# Patient Record
Sex: FEMALE | Race: OTHER | Employment: FULL TIME | URBAN - METROPOLITAN AREA
[De-identification: names, ages, dates, MRNs, and addresses within clinical notes are randomized per-mention and may not be internally consistent; named-entity substitution may affect disease eponyms.]

---

## 2017-06-20 ENCOUNTER — LAB ENCOUNTER (OUTPATIENT)
Dept: LAB | Facility: REFERENCE LAB | Age: 22
End: 2017-06-20
Attending: OBSTETRICS & GYNECOLOGY
Payer: COMMERCIAL

## 2017-06-20 ENCOUNTER — OFFICE VISIT (OUTPATIENT)
Dept: OBGYN CLINIC | Facility: CLINIC | Age: 22
End: 2017-06-20

## 2017-06-20 VITALS
BODY MASS INDEX: 22.87 KG/M2 | WEIGHT: 142.31 LBS | DIASTOLIC BLOOD PRESSURE: 80 MMHG | SYSTOLIC BLOOD PRESSURE: 120 MMHG | HEIGHT: 66 IN

## 2017-06-20 DIAGNOSIS — Z11.3 SCREEN FOR STD (SEXUALLY TRANSMITTED DISEASE): ICD-10-CM

## 2017-06-20 DIAGNOSIS — N89.8 VAGINAL LESION: Primary | ICD-10-CM

## 2017-06-20 DIAGNOSIS — N89.8 VAGINAL LESION: ICD-10-CM

## 2017-06-20 DIAGNOSIS — N89.8 VAGINAL DISCHARGE: ICD-10-CM

## 2017-06-20 PROCEDURE — 36415 COLL VENOUS BLD VENIPUNCTURE: CPT

## 2017-06-20 PROCEDURE — 87205 SMEAR GRAM STAIN: CPT | Performed by: OBSTETRICS & GYNECOLOGY

## 2017-06-20 PROCEDURE — 87808 TRICHOMONAS ASSAY W/OPTIC: CPT | Performed by: OBSTETRICS & GYNECOLOGY

## 2017-06-20 PROCEDURE — 86696 HERPES SIMPLEX TYPE 2 TEST: CPT

## 2017-06-20 PROCEDURE — 86695 HERPES SIMPLEX TYPE 1 TEST: CPT

## 2017-06-20 PROCEDURE — 86694 HERPES SIMPLEX NES ANTBDY: CPT

## 2017-06-20 PROCEDURE — 87106 FUNGI IDENTIFICATION YEAST: CPT | Performed by: OBSTETRICS & GYNECOLOGY

## 2017-06-20 PROCEDURE — 99203 OFFICE O/P NEW LOW 30 MIN: CPT | Performed by: OBSTETRICS & GYNECOLOGY

## 2017-06-20 PROCEDURE — 87798 DETECT AGENT NOS DNA AMP: CPT | Performed by: OBSTETRICS & GYNECOLOGY

## 2017-06-20 PROCEDURE — G0452 MOLECULAR PATHOLOGY INTERPR: HCPCS | Performed by: OBSTETRICS & GYNECOLOGY

## 2017-06-20 RX ORDER — PHENTERMINE HYDROCHLORIDE 37.5 MG/1
TABLET ORAL
COMMUNITY
Start: 2017-05-23

## 2017-06-20 NOTE — PROGRESS NOTES
GYN H&P     2017  9:37 AM    CC: Patient is here to establish care    HPI: Patient is a 22year old  here with concerns about recurrent vaginal discharge, irritation, and vulvar sore that feels like a cut.  Has been using multiple antibiotic pre tenderness with erosive area, HSV cultures done; mucoid discharge cultures obtained   Uterus: normal size, mobile, nontender  Cervix: normal os, no lesions or bleeding  Adnexa:normal size, bilaterally nontender, no palpable masses  Cul-de-sac: normal  R/V:

## 2017-06-22 ENCOUNTER — TELEPHONE (OUTPATIENT)
Dept: OBGYN CLINIC | Facility: CLINIC | Age: 22
End: 2017-06-22

## 2017-06-22 RX ORDER — VALACYCLOVIR HYDROCHLORIDE 1 G/1
1 TABLET, FILM COATED ORAL DAILY
Qty: 10 TABLET | Refills: 0 | Status: SHIPPED | OUTPATIENT
Start: 2017-06-22 | End: 2017-07-02

## 2017-06-22 NOTE — PROGRESS NOTES
Quick Note:    Results given to pt by Dr Terry Ramos, Rx sent over for Valtrex 1g p.o daily x 10days  ______

## 2017-06-22 NOTE — TELEPHONE ENCOUNTER
Pt notified that test results are in with one pending. Results need to be discussed with Dr. Nancy Fontaine when she returns on Fri. Pt states please have Dr. Nancy Fontaine call as soon as possible.

## 2017-07-13 ENCOUNTER — OFFICE VISIT (OUTPATIENT)
Dept: OBGYN CLINIC | Facility: CLINIC | Age: 22
End: 2017-07-13

## 2017-07-13 VITALS
SYSTOLIC BLOOD PRESSURE: 120 MMHG | WEIGHT: 143.19 LBS | BODY MASS INDEX: 23.01 KG/M2 | HEIGHT: 66 IN | DIASTOLIC BLOOD PRESSURE: 74 MMHG

## 2017-07-13 DIAGNOSIS — R39.9 UTI SYMPTOMS: Primary | ICD-10-CM

## 2017-07-13 DIAGNOSIS — N89.8 VAGINAL ODOR: ICD-10-CM

## 2017-07-13 LAB
APPEARANCE: CLEAR
BILIRUB UR QL: NEGATIVE
CLARITY UR: CLEAR
COLOR UR: YELLOW
GLUCOSE UR-MCNC: NEGATIVE MG/DL
HGB UR QL STRIP.AUTO: NEGATIVE
KETONES UR-MCNC: NEGATIVE MG/DL
LEUKOCYTE ESTERASE UR QL STRIP.AUTO: NEGATIVE
MULTISTIX LOT#: NORMAL NUMERIC
NITRITE UR QL STRIP.AUTO: NEGATIVE
PH UR: 7 [PH] (ref 5–8)
PH, URINE: 7 (ref 4.5–8)
PROT UR-MCNC: NEGATIVE MG/DL
SP GR UR STRIP: 1.01 (ref 1–1.03)
SPECIFIC GRAVITY: 1.02 (ref 1–1.03)
UROBILINOGEN UR STRIP-ACNC: <2
UROBILINOGEN,SEMI-QN: 0.2 MG/DL (ref 0–1.9)
VIT C UR-MCNC: NEGATIVE MG/DL

## 2017-07-13 PROCEDURE — 81002 URINALYSIS NONAUTO W/O SCOPE: CPT | Performed by: OBSTETRICS & GYNECOLOGY

## 2017-07-13 PROCEDURE — 87086 URINE CULTURE/COLONY COUNT: CPT | Performed by: OBSTETRICS & GYNECOLOGY

## 2017-07-13 PROCEDURE — 87106 FUNGI IDENTIFICATION YEAST: CPT | Performed by: OBSTETRICS & GYNECOLOGY

## 2017-07-13 PROCEDURE — 99213 OFFICE O/P EST LOW 20 MIN: CPT | Performed by: OBSTETRICS & GYNECOLOGY

## 2017-07-13 PROCEDURE — 87808 TRICHOMONAS ASSAY W/OPTIC: CPT | Performed by: OBSTETRICS & GYNECOLOGY

## 2017-07-13 PROCEDURE — 87205 SMEAR GRAM STAIN: CPT | Performed by: OBSTETRICS & GYNECOLOGY

## 2017-07-13 RX ORDER — CEFADROXIL 500 MG/1
CAPSULE ORAL
COMMUNITY
Start: 2017-05-23

## 2017-07-13 RX ORDER — MELOXICAM 15 MG/1
TABLET ORAL
COMMUNITY
Start: 2017-07-12

## 2017-07-13 NOTE — PROGRESS NOTES
Shira Dickerson is a 25year old female. HPI:   Patient presents with:  Vaginal Problem: vaginal odor and some lower back pain, pt thinks might be UTI       Here with concerns about increasingly notable vaginal odor and urinary irritative symptoms.  Gokul Chambers

## 2017-07-15 LAB
GENITAL VAGINOSIS SCREEN: POSITIVE
TRICHOMONAS SCREEN: NEGATIVE

## 2017-07-17 ENCOUNTER — TELEPHONE (OUTPATIENT)
Dept: OBGYN CLINIC | Facility: CLINIC | Age: 22
End: 2017-07-17

## 2017-07-17 RX ORDER — CLINDAMYCIN HYDROCHLORIDE 300 MG/1
300 CAPSULE ORAL 2 TIMES DAILY
Qty: 14 CAPSULE | Refills: 0 | Status: SHIPPED | OUTPATIENT
Start: 2017-07-17 | End: 2017-07-17

## 2017-07-17 RX ORDER — CLINDAMYCIN HYDROCHLORIDE 300 MG/1
300 CAPSULE ORAL 2 TIMES DAILY
Qty: 14 CAPSULE | Refills: 0 | Status: SHIPPED | OUTPATIENT
Start: 2017-07-17 | End: 2017-07-24

## 2017-07-17 NOTE — TELEPHONE ENCOUNTER
----- Message from Donn Kent MD sent at 7/14/2017 11:46 AM CDT -----  Your lab results are positive for BV with yeast still pending    An eRx will be done for oral Clindamycin 300 mg twice daily x 7 days  We will notify you when yeast results retur

## 2017-08-17 ENCOUNTER — TELEPHONE (OUTPATIENT)
Dept: OBGYN CLINIC | Facility: CLINIC | Age: 22
End: 2017-08-17

## 2017-08-17 NOTE — TELEPHONE ENCOUNTER
Pt calling to request refill for Clindamycin. Pt tested positive for BV  on 07/13/2017, for which she was treated with Clindamycin for 7 days. Stated foul odor has returned after it had cleared up for a while immediately after course of med.  Instructed to

## 2017-11-27 ENCOUNTER — APPOINTMENT (OUTPATIENT)
Dept: OTHER | Facility: HOSPITAL | Age: 22
End: 2017-11-27
Attending: PREVENTIVE MEDICINE

## 2018-04-25 ENCOUNTER — TELEPHONE (OUTPATIENT)
Dept: OBGYN CLINIC | Facility: CLINIC | Age: 23
End: 2018-04-25

## 2018-05-15 ENCOUNTER — TELEPHONE (OUTPATIENT)
Dept: OBGYN CLINIC | Facility: CLINIC | Age: 23
End: 2018-05-15

## 2018-08-10 ENCOUNTER — HOSPITAL (OUTPATIENT)
Dept: OTHER | Age: 23
End: 2018-08-10
Attending: EMERGENCY MEDICINE

## 2018-09-10 ENCOUNTER — HOSPITAL (OUTPATIENT)
Dept: OTHER | Age: 23
End: 2018-09-10
Attending: PHYSICIAN ASSISTANT

## (undated) NOTE — MR AVS SNAPSHOT
[address] at [address], [facility]  [phone]               Thank you for choosing us for your health care visit with Pablo Khanna MD.  We are glad to serve you and happy to provide you wi